# Patient Record
Sex: MALE | Race: OTHER | NOT HISPANIC OR LATINO | URBAN - METROPOLITAN AREA
[De-identification: names, ages, dates, MRNs, and addresses within clinical notes are randomized per-mention and may not be internally consistent; named-entity substitution may affect disease eponyms.]

---

## 2017-11-15 ENCOUNTER — EMERGENCY (EMERGENCY)
Facility: HOSPITAL | Age: 22
LOS: 1 days | Discharge: HOME | End: 2017-11-15
Admitting: EMERGENCY MEDICINE
Payer: COMMERCIAL

## 2017-11-15 VITALS
OXYGEN SATURATION: 100 % | SYSTOLIC BLOOD PRESSURE: 149 MMHG | RESPIRATION RATE: 18 BRPM | TEMPERATURE: 98 F | DIASTOLIC BLOOD PRESSURE: 91 MMHG | WEIGHT: 121.25 LBS | HEART RATE: 90 BPM

## 2017-11-15 DIAGNOSIS — R55 SYNCOPE AND COLLAPSE: ICD-10-CM

## 2017-11-15 DIAGNOSIS — R51 HEADACHE: ICD-10-CM

## 2017-11-15 DIAGNOSIS — F17.210 NICOTINE DEPENDENCE, CIGARETTES, UNCOMPLICATED: ICD-10-CM

## 2017-11-15 LAB
ALBUMIN SERPL ELPH-MCNC: 4.5 G/DL — SIGNIFICANT CHANGE UP (ref 3.4–5)
ALP SERPL-CCNC: 50 U/L — SIGNIFICANT CHANGE UP (ref 40–120)
ALT FLD-CCNC: 9 U/L — LOW (ref 12–42)
ANION GAP SERPL CALC-SCNC: 6 MMOL/L — LOW (ref 9–16)
AST SERPL-CCNC: 11 U/L — LOW (ref 15–37)
BASOPHILS NFR BLD AUTO: 0.4 % — SIGNIFICANT CHANGE UP (ref 0–2)
BILIRUB SERPL-MCNC: 1.3 MG/DL — HIGH (ref 0.2–1.2)
BUN SERPL-MCNC: 14 MG/DL — SIGNIFICANT CHANGE UP (ref 7–23)
CALCIUM SERPL-MCNC: 9.4 MG/DL — SIGNIFICANT CHANGE UP (ref 8.5–10.5)
CHLORIDE SERPL-SCNC: 105 MMOL/L — SIGNIFICANT CHANGE UP (ref 96–108)
CO2 SERPL-SCNC: 28 MMOL/L — SIGNIFICANT CHANGE UP (ref 22–31)
CREAT SERPL-MCNC: 0.73 MG/DL — SIGNIFICANT CHANGE UP (ref 0.5–1.3)
EOSINOPHIL NFR BLD AUTO: 4.6 % — SIGNIFICANT CHANGE UP (ref 0–6)
ETHANOL SERPL-MCNC: <3 MG/DL — SIGNIFICANT CHANGE UP
GLUCOSE SERPL-MCNC: 103 MG/DL — HIGH (ref 70–99)
HCT VFR BLD CALC: 39.3 % — SIGNIFICANT CHANGE UP (ref 39–50)
HGB BLD-MCNC: 13.2 G/DL — SIGNIFICANT CHANGE UP (ref 13–17)
IMM GRANULOCYTES NFR BLD AUTO: 0.1 % — SIGNIFICANT CHANGE UP (ref 0–1.5)
LYMPHOCYTES # BLD AUTO: 38.4 % — SIGNIFICANT CHANGE UP (ref 13–44)
MCHC RBC-ENTMCNC: 27.3 PG — SIGNIFICANT CHANGE UP (ref 27–34)
MCHC RBC-ENTMCNC: 33.6 G/DL — SIGNIFICANT CHANGE UP (ref 32–36)
MCV RBC AUTO: 81.4 FL — SIGNIFICANT CHANGE UP (ref 80–100)
MONOCYTES NFR BLD AUTO: 7 % — SIGNIFICANT CHANGE UP (ref 2–14)
NEUTROPHILS NFR BLD AUTO: 49.5 % — SIGNIFICANT CHANGE UP (ref 43–77)
PLATELET # BLD AUTO: 211 K/UL — SIGNIFICANT CHANGE UP (ref 150–400)
POTASSIUM SERPL-MCNC: 3.5 MMOL/L — SIGNIFICANT CHANGE UP (ref 3.5–5.3)
POTASSIUM SERPL-SCNC: 3.5 MMOL/L — SIGNIFICANT CHANGE UP (ref 3.5–5.3)
PROT SERPL-MCNC: 7.3 G/DL — SIGNIFICANT CHANGE UP (ref 6.4–8.2)
RBC # BLD: 4.83 M/UL — SIGNIFICANT CHANGE UP (ref 4.2–5.8)
RBC # FLD: 12.5 % — SIGNIFICANT CHANGE UP (ref 10.3–16.9)
SODIUM SERPL-SCNC: 139 MMOL/L — SIGNIFICANT CHANGE UP (ref 132–145)
WBC # BLD: 9 K/UL — SIGNIFICANT CHANGE UP (ref 3.8–10.5)
WBC # FLD AUTO: 9 K/UL — SIGNIFICANT CHANGE UP (ref 3.8–10.5)

## 2017-11-15 PROCEDURE — 99285 EMERGENCY DEPT VISIT HI MDM: CPT | Mod: 25

## 2017-11-15 PROCEDURE — 93010 ELECTROCARDIOGRAM REPORT: CPT

## 2017-11-15 PROCEDURE — 70450 CT HEAD/BRAIN W/O DYE: CPT | Mod: 26

## 2017-11-15 RX ORDER — SODIUM CHLORIDE 9 MG/ML
1000 INJECTION INTRAMUSCULAR; INTRAVENOUS; SUBCUTANEOUS ONCE
Qty: 0 | Refills: 0 | Status: COMPLETED | OUTPATIENT
Start: 2017-11-15 | End: 2017-11-15

## 2017-11-15 RX ADMIN — SODIUM CHLORIDE 1000 MILLILITER(S): 9 INJECTION INTRAMUSCULAR; INTRAVENOUS; SUBCUTANEOUS at 22:45

## 2017-11-15 NOTE — ED PROVIDER NOTE - OBJECTIVE STATEMENT
21 y/o M with no known significant PMH, accompanied by coworker, presents c/o syncopal episode approximately 45 minutes prior to arrival while at work this evening. Pt states he was standing at work when he began to feel lightheaded, nauseous and weak for several seconds. He then syncopized and collapsed, in which he woke up on the floor seconds later. His accompanying coworker states that pt collapsed and hit his head against a glass door. Coworker states pt was only unconscious for 1-2 seconds before opening his eyes and responding. Pt now reports feeling weak and lightheaded with a slight nonspecific headache. He reports eating and staying hydrated today and denies any hx of similar episodes in the past.    Ken fever, chills, diplopia, CP, SOB, palpitations, neck pain, abdo pain, vomiting, diarrhea 21 y/o M with no known significant PMH, accompanied by coworker, presents c/o syncopal episode approximately 45 minutes prior to arrival while at work this evening. Pt states he was standing at work when he began to feel lightheaded, nauseous and weak for several seconds. He then syncopized and collapsed, in which he woke up on the floor seconds later. His accompanying coworker states that pt collapsed and hit his head against a glass door. Coworker states pt was only unconscious for 1-2 seconds before opening his eyes and responding. Pt now reports feeling weak and lightheaded with a slight nonspecific headache. He reports eating and staying hydrated today and denies any hx of similar episodes in the past.    Ken fever, chills, diplopia, CP, SOB, palpitations, neck pain, abdo pain, vomiting, diarrhea, focal numbness or focal weakness

## 2017-11-15 NOTE — ED PROVIDER NOTE - MEDICAL DECISION MAKING DETAILS
Labs reviewed. CT Head negative for acute intracranial trauma. EKG NSR, nonischemic. Pt reports feeling much better after being given IVFs. A&Ox3. NAD. AFVSS. Sitting comfortably. Will D/C. F/U with PMD this week. Strict return precautions reviewed with pt in which pt verbalizes understanding and agrees to.

## 2017-11-15 NOTE — ED ADULT NURSE NOTE - OBJECTIVE STATEMENT
Pt c/o syncopal episode while at work. Pt presents to ED with headache and dizziness, denies any N/V/D, no fever/chills, no CP/SOB. Pt reports hes unsure whether he hit his head or not. Denies any PMH.

## 2017-11-15 NOTE — ED ADULT NURSE NOTE - CHPI ED SYMPTOMS NEG
no chest pain/no back pain/no chills/no diaphoresis/no shortness of breath/no vomiting/no cough/no fever/no nausea

## 2017-11-15 NOTE — ED ADULT TRIAGE NOTE - CHIEF COMPLAINT QUOTE
pt came in ambulatory, accompanied by a friend, for syncopal episode at work. (+) dizziness. No chest pain/SOB.

## 2017-11-16 VITALS
DIASTOLIC BLOOD PRESSURE: 82 MMHG | HEART RATE: 64 BPM | SYSTOLIC BLOOD PRESSURE: 132 MMHG | RESPIRATION RATE: 18 BRPM | OXYGEN SATURATION: 100 %

## 2021-10-22 NOTE — ED PROVIDER NOTE - EXITCARE/DISCHARGE INSTRUCTIONS
Pt will need outpatient cardiac catheterization once infection treated  Dr. Wilkins, Cardiology, following [facility] [employer] and print the 'Prescriptions from this Visit' Report
